# Patient Record
Sex: FEMALE | Race: WHITE | RURAL
[De-identification: names, ages, dates, MRNs, and addresses within clinical notes are randomized per-mention and may not be internally consistent; named-entity substitution may affect disease eponyms.]

---

## 2022-08-04 ENCOUNTER — APPOINTMENT (RX ONLY)
Dept: RURAL CLINIC 2 | Facility: CLINIC | Age: 62
Setting detail: DERMATOLOGY
End: 2022-08-04

## 2022-08-04 DIAGNOSIS — L82.1 OTHER SEBORRHEIC KERATOSIS: ICD-10-CM

## 2022-08-04 DIAGNOSIS — Z71.89 OTHER SPECIFIED COUNSELING: ICD-10-CM

## 2022-08-04 DIAGNOSIS — L89 PRESSURE ULCER: ICD-10-CM

## 2022-08-04 DIAGNOSIS — L57.0 ACTINIC KERATOSIS: ICD-10-CM

## 2022-08-04 PROBLEM — L89.819 PRESSURE ULCER OF HEAD, UNSPECIFIED STAGE: Status: ACTIVE | Noted: 2022-08-04

## 2022-08-04 PROCEDURE — 99204 OFFICE O/P NEW MOD 45 MIN: CPT

## 2022-08-04 PROCEDURE — ? COUNSELING

## 2022-08-04 PROCEDURE — ? SUNSCREEN RECOMMENDATIONS

## 2022-08-04 PROCEDURE — ? RECOMMENDATIONS

## 2022-08-04 PROCEDURE — ? DIAGNOSIS COMMENT

## 2022-08-04 PROCEDURE — ? PRESCRIPTION

## 2022-08-04 RX ORDER — FLUOROURACIL 5 MG/G
CREAM TOPICAL
Qty: 40 | Refills: 0 | Status: CANCELLED

## 2022-08-04 ASSESSMENT — LOCATION DETAILED DESCRIPTION DERM
LOCATION DETAILED: LEFT NASAL SIDEWALL
LOCATION DETAILED: LEFT UPPER CUTANEOUS LIP
LOCATION DETAILED: RIGHT PROXIMAL CALF
LOCATION DETAILED: INFERIOR THORACIC SPINE
LOCATION DETAILED: LEFT DISTAL POSTERIOR THIGH

## 2022-08-04 ASSESSMENT — LOCATION SIMPLE DESCRIPTION DERM
LOCATION SIMPLE: RIGHT CALF
LOCATION SIMPLE: LEFT NOSE
LOCATION SIMPLE: UPPER BACK
LOCATION SIMPLE: LEFT LIP
LOCATION SIMPLE: LEFT POSTERIOR THIGH

## 2022-08-04 ASSESSMENT — LOCATION ZONE DERM
LOCATION ZONE: TRUNK
LOCATION ZONE: NOSE
LOCATION ZONE: LEG
LOCATION ZONE: LIP

## 2022-08-04 NOTE — PROCEDURE: RECOMMENDATIONS
Render Risk Assessment In Note?: no
Recommendation Preamble: The following recommendations were made during the visit:
Recommendations (Free Text): DuoDERM extra thin every 3 to 5 days.  If not completely healed and remaining resolved in 6 weeks, or if any worsening prior to that, RTO for evaluation and possible biopsy.  We also discussed that she may need to have her glasses adjusted or change the glasses style.
Detail Level: Zone

## 2022-08-04 NOTE — PROCEDURE: DIAGNOSIS COMMENT
Render Risk Assessment In Note?: no
Comment: 6 mm soft mobile pink patch with 2 mm superficial erosion directly beneath eyeglasses pad on the left nasal root/sidewall.  Patient gives history of this being present several months, but then comes and goes and has healed completely.\\n\\nAppears to be pressure induced from her glasses pad.  Nothing at this time to suggest actinic keratosis or malignancy.
Detail Level: Simple

## 2022-08-04 NOTE — PROCEDURE: SUNSCREEN RECOMMENDATIONS
Detail Level: Detailed
General Sunscreen Counseling: I recommended a broad spectrum sunscreen with an SPF of 30 or higher. I explained that SPF 30 sunscreens block approximately 97 percent of\\nthe sun's harmful rays. Sunscreens should be applied at least 15 minutes prior to expected sun exposure and then every 2 hours after that as long\\padilla sun exposure continues. If swimming or exercising, sunscreen should be reapplied every 45 minutes to an hour after getting wet or sweating.\\nOne ounce, or the equivalent of a shot glass full of sunscreen, is adequate to protect the skin not covered by a bathing suit. I also recommended a\\nlip balm with at least an SPF 30 sunscreen as well. The best sun protective measure is the use of sun protective clothing, including long-sleeved shirts, long pants, neck/face gaiter, and a wide-brimmed hat. The ABCDEs of melanoma were reviewed with the patient, and the importance of\\nmonthly full body skin self-examination was emphasized. Should any moles or other skin lesions change in size, shape, color, surface\\ncharacteristics, or itch, bleed or burn, or should any new skin lesions develop, pt will contact our office for evaluation as soon as possible (sooner\\nthen their interval appointment).

## 2023-01-09 ENCOUNTER — APPOINTMENT (RX ONLY)
Dept: RURAL CLINIC 2 | Facility: CLINIC | Age: 63
Setting detail: DERMATOLOGY
End: 2023-01-09

## 2023-01-09 DIAGNOSIS — L71.0 PERIORAL DERMATITIS: ICD-10-CM

## 2023-01-09 PROCEDURE — ? PRESCRIPTION

## 2023-01-09 RX ORDER — MINOCYCLINE HYDROCHLORIDE 50 MG/1
CAPSULE ORAL
Qty: 30 | Refills: 0 | Status: ERX | COMMUNITY
Start: 2023-01-09

## 2023-01-09 RX ORDER — HYDROCORTISONE 25 MG/G
CREAM TOPICAL
Qty: 20 | Refills: 0 | Status: ERX | COMMUNITY
Start: 2023-01-09

## 2023-01-09 RX ADMIN — MINOCYCLINE HYDROCHLORIDE: 50 CAPSULE ORAL at 00:00

## 2023-01-09 RX ADMIN — HYDROCORTISONE: 25 CREAM TOPICAL at 00:00

## 2023-08-14 ENCOUNTER — APPOINTMENT (RX ONLY)
Dept: RURAL CLINIC 2 | Facility: CLINIC | Age: 63
Setting detail: DERMATOLOGY
End: 2023-08-14

## 2023-08-14 DIAGNOSIS — L57.8 OTHER SKIN CHANGES DUE TO CHRONIC EXPOSURE TO NONIONIZING RADIATION: ICD-10-CM

## 2023-08-14 DIAGNOSIS — L57.0 ACTINIC KERATOSIS: ICD-10-CM

## 2023-08-14 DIAGNOSIS — L82.1 OTHER SEBORRHEIC KERATOSIS: ICD-10-CM

## 2023-08-14 DIAGNOSIS — Z71.89 OTHER SPECIFIED COUNSELING: ICD-10-CM

## 2023-08-14 PROCEDURE — 17000 DESTRUCT PREMALG LESION: CPT

## 2023-08-14 PROCEDURE — ? LIQUID NITROGEN

## 2023-08-14 PROCEDURE — 99213 OFFICE O/P EST LOW 20 MIN: CPT | Mod: 25

## 2023-08-14 PROCEDURE — ? SUNSCREEN RECOMMENDATIONS

## 2023-08-14 PROCEDURE — ? DIAGNOSIS COMMENT

## 2023-08-14 PROCEDURE — ? COUNSELING

## 2023-08-14 ASSESSMENT — LOCATION DETAILED DESCRIPTION DERM
LOCATION DETAILED: NASAL DORSUM
LOCATION DETAILED: LEFT PROXIMAL PRETIBIAL REGION

## 2023-08-14 ASSESSMENT — LOCATION ZONE DERM
LOCATION ZONE: LEG
LOCATION ZONE: NOSE

## 2023-08-14 ASSESSMENT — LOCATION SIMPLE DESCRIPTION DERM
LOCATION SIMPLE: LEFT PRETIBIAL REGION
LOCATION SIMPLE: NOSE

## 2023-08-14 NOTE — PROCEDURE: DIAGNOSIS COMMENT
Comment: Significant photodamage on trunk, arms, face.
Detail Level: Simple
Render Risk Assessment In Note?: no

## 2023-08-14 NOTE — PROCEDURE: LIQUID NITROGEN
Render Note In Bullet Format When Appropriate: No
Show Aperture Variable?: Yes
Consent: The patient's consent was obtained including but not limited to risks of crusting, scabbing, blistering, scarring, darker or lighter pigmentary change, recurrence, incomplete removal and infection.
Detail Level: Detailed
Post-Care Instructions: I reviewed with the patient in detail post-care instructions. Patient is to wear sunprotection, and avoid picking at any of the treated lesions. Pt may apply Vaseline to crusted or scabbing areas.
Number Of Freeze-Thaw Cycles: 2 freeze-thaw cycles
Duration Of Freeze Thaw-Cycle (Seconds): 0

## 2023-08-14 NOTE — PROCEDURE: SUNSCREEN RECOMMENDATIONS
Detail Level: Detailed
General Sunscreen Counseling: I recommended a broad spectrum sunscreen with an SPF of 30 or higher. I explained that SPF 30 sunscreens block approximately 97 percent of\\nthe sun's harmful rays. Sunscreens should be applied at least 15 minutes prior to expected sun exposure and then every 2 hours after that as long\\padilla sun exposure continues. If swimming or exercising, sunscreen should be reapplied every 45 minutes to an hour after getting wet or sweating.\\nOne ounce, or the equivalent of a shot glass full of sunscreen, is adequate to protect the skin not covered by a bathing suit. I also recommended a\\nlip balm with at least an SPF 30 sunscreen as well. The best sun protective measure is the use of sun protective clothing, including long-sleeved shirts, long pants, neck/face gaitor, and a wide-brimmed hat. The ABCDEs of melanoma were reviewed with the patient, and the importance of\\nmonthly full body skin self-examination was emphasized. Should any moles or other skin lesions change in size, shape, color, surface\\ncharacteristics, or itch, bleed or burn, or should any new skin lesions develop, pt will contact our office for evaluation as soon as possible (sooner\\nthen their interval appointment).

## 2024-08-19 ENCOUNTER — APPOINTMENT (RX ONLY)
Dept: RURAL CLINIC 2 | Facility: CLINIC | Age: 64
Setting detail: DERMATOLOGY
End: 2024-08-19

## 2024-08-19 DIAGNOSIS — L82.1 OTHER SEBORRHEIC KERATOSIS: ICD-10-CM

## 2024-08-19 DIAGNOSIS — Z71.89 OTHER SPECIFIED COUNSELING: ICD-10-CM

## 2024-08-19 DIAGNOSIS — L81.4 OTHER MELANIN HYPERPIGMENTATION: ICD-10-CM

## 2024-08-19 PROCEDURE — 99213 OFFICE O/P EST LOW 20 MIN: CPT

## 2024-08-19 PROCEDURE — ? COUNSELING

## 2024-08-19 PROCEDURE — ? SUNSCREEN RECOMMENDATIONS

## 2024-08-19 ASSESSMENT — LOCATION SIMPLE DESCRIPTION DERM
LOCATION SIMPLE: LEFT SHOULDER
LOCATION SIMPLE: RIGHT THIGH
LOCATION SIMPLE: LEFT THIGH
LOCATION SIMPLE: RIGHT SHOULDER

## 2024-08-19 ASSESSMENT — LOCATION ZONE DERM
LOCATION ZONE: ARM
LOCATION ZONE: LEG

## 2024-08-19 ASSESSMENT — LOCATION DETAILED DESCRIPTION DERM
LOCATION DETAILED: RIGHT ANTERIOR SHOULDER
LOCATION DETAILED: LEFT ANTERIOR DISTAL THIGH
LOCATION DETAILED: LEFT ANTERIOR SHOULDER
LOCATION DETAILED: RIGHT ANTERIOR PROXIMAL THIGH